# Patient Record
Sex: MALE
[De-identification: names, ages, dates, MRNs, and addresses within clinical notes are randomized per-mention and may not be internally consistent; named-entity substitution may affect disease eponyms.]

---

## 2019-10-22 PROBLEM — Z00.00 ENCOUNTER FOR PREVENTIVE HEALTH EXAMINATION: Status: ACTIVE | Noted: 2019-10-22

## 2019-10-23 ENCOUNTER — APPOINTMENT (OUTPATIENT)
Dept: OTOLARYNGOLOGY | Facility: CLINIC | Age: 79
End: 2019-10-23
Payer: MEDICARE

## 2019-10-23 VITALS
SYSTOLIC BLOOD PRESSURE: 127 MMHG | WEIGHT: 148 LBS | HEIGHT: 67 IN | BODY MASS INDEX: 23.23 KG/M2 | HEART RATE: 64 BPM | DIASTOLIC BLOOD PRESSURE: 64 MMHG

## 2019-10-23 DIAGNOSIS — Z82.3 FAMILY HISTORY OF STROKE: ICD-10-CM

## 2019-10-23 DIAGNOSIS — Z82.49 FAMILY HISTORY OF ISCHEMIC HEART DISEASE AND OTHER DISEASES OF THE CIRCULATORY SYSTEM: ICD-10-CM

## 2019-10-23 DIAGNOSIS — Z87.39 PERSONAL HISTORY OF OTHER DISEASES OF THE MUSCULOSKELETAL SYSTEM AND CONNECTIVE TISSUE: ICD-10-CM

## 2019-10-23 DIAGNOSIS — Z86.39 PERSONAL HISTORY OF OTHER ENDOCRINE, NUTRITIONAL AND METABOLIC DISEASE: ICD-10-CM

## 2019-10-23 DIAGNOSIS — Z86.79 PERSONAL HISTORY OF OTHER DISEASES OF THE CIRCULATORY SYSTEM: ICD-10-CM

## 2019-10-23 DIAGNOSIS — Z82.5 FAMILY HISTORY OF ASTHMA AND OTHER CHRONIC LOWER RESPIRATORY DISEASES: ICD-10-CM

## 2019-10-23 DIAGNOSIS — Z87.09 PERSONAL HISTORY OF OTHER DISEASES OF THE RESPIRATORY SYSTEM: ICD-10-CM

## 2019-10-23 DIAGNOSIS — Z83.3 FAMILY HISTORY OF DIABETES MELLITUS: ICD-10-CM

## 2019-10-23 PROCEDURE — 31231 NASAL ENDOSCOPY DX: CPT

## 2019-10-23 PROCEDURE — 99213 OFFICE O/P EST LOW 20 MIN: CPT | Mod: 25

## 2019-10-23 PROCEDURE — 69210 REMOVE IMPACTED EAR WAX UNI: CPT

## 2019-10-23 PROCEDURE — 99203 OFFICE O/P NEW LOW 30 MIN: CPT | Mod: 25

## 2019-10-23 NOTE — CONSULT LETTER
[FreeTextEntry1] : \par \par Dear  Dr. KEERTHI HERNANDEZ,\par \par I had the pleasure of seeing your patient today.  \par Please see my note below.\par \par \par Thank you very much for allowing me to participate in the care of your patient.\par \par Sincerely,\par \par \par Brian Shay MD\par [facility]\par [facility]\par  [facility]\par \par  [FreeTextEntry2] : KEERTHI HERNANDEZ\par

## 2019-10-23 NOTE — PHYSICAL EXAM
[FreeTextEntry1] : \par The patient was alert and oriented and in no distress.\par Voice was clear.\par \par Face:\par The patient had no facial asymmetry or mass.\par The skin was unremarkable.\par \par Eyes:\par The pupils were equal round and reactive to light and accommodation.\par There was no significant nystagmus or disconjugate gaze noted.\par \par Nose: \par The external nose had no significant deformity.  There was no facial tenderness.  On anterior rhinoscopy, the nasal mucosa was clear.  \par \par \par Oral cavity:\par The oral mucosa was normal.\par The oral and base of tongue were clear and without mass.\par The gingival and buccal mucosa were moist and without lesions.\par The palate moved well.\par There was no cleft to the palate.\par There appeared to be good salivary flow.  \par There was no pus, erythema or mass in the oral cavity.\par Ears:\par  Procedure note:\par  Removal of Cerumen Impactions, both ears:  48840-17\par Both external ears were normal.\par There were symptomatic cerumen impactions in both ears.  These were cleared microscopically without trauma using both suction and curettes.  After clearing,  both ear canals were clear both eardrums were intact and mobile.  \par Neck: \par The neck was symmetrical.\par The parotid and submandibular glands were normal without masses.\par The trachea was midline and there was no unusual crepitus.\par The thyroid was smooth and nontender and no masses were palpated.\par There was no significant cervical adenopathy.\par \par \par Neuro:\par Neurologically, the patient was awake, alert, and oriented to person, place and time. There were no obvious focal neurologic abnormalities.  Cranial nerves II through XII were grossly intact.\par \par \par TMJ:\par The temporomandibular joints were nontender.\par There was no abnormal crepitus and no significant malocclusion\par \par Nasal endoscopy: \par \par Procedure Note:\par \par Nasal endoscopy was done with topical anesthesia and a fiberoptic endoscope.\par Indication: Nasal congestion, rule out sinusitis.\par Procedure: The nasal cavity was anesthetized with topical Afrin and Pontocaine. An  endoscope was used and inserted into the nasal cavity. \par Endocoscopy was performed to inspect the interior of the nasal cavity, the nasal septum,  the middle and superior meati, the inferior, middle and superior turbinates, and the spheno-ethmoidal  recesses, the nasopharynx and eustachian tube orifices bilaterally\par  This showed that the nasal mucosa was slightly boggy.  There was a moderate S-shaped septal deflection.  However, the middle meati were open.  There were no polyps, purulence or masses.  The eustachian tube orifices were clear.  The nasopharynx was benign and without mass.  The inferior and middle turbinates were slightly boggy, the superior meati were normal and the sphenoethmoidal recesses were without evidence of disease.\par \par Flexible fiberoptic laryngoscopy: [facility] [id number]\par Indications: Dysphagia\par Procedure note:\par \par Flexible fiberoptic laryngoscopy was performed because of dysphagia and because of the patient's inability to tolerate adequate mirror examination.\par \par The nasal cavity was anesthetized with Pontocaine and Afrin.\par The flexible endoscope was placed into the patient's nasal cavity.\par The nasopharynx was without masses.\par The oropharynx, vallecula and base of tongue had no masses.\par The epiglottis, aryepiglottic folds and false vocal cords were normal.\par The pyriform sinuses were without mucosal lesions or pooling of secretions.  \par The laryngeal ventricles were without lesions.\par The visualized subglottis was without mass.\par The lateral and posterior pharyngeal walls were clear and symmetrical.\par The vocal folds were clear and mobile; they abducted and adducted normally.\par There was no interarytenoid mass or fullness.\par \par

## 2019-10-23 NOTE — REVIEW OF SYSTEMS
[Sneezing] : sneezing [Hearing Loss] : hearing loss [Throat Clearing] : throat clearing [Hoarseness] : hoarseness [Cough] : cough [Joint Pain] : joint pain [Itching] : itching [Erectile Dysfunction] : erectile dysfunction [Negative] : Heme/Lymph

## 2019-10-23 NOTE — HISTORY OF PRESENT ILLNESS
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 Additionally, he has a history of cerumen impaction nose and hearing loss. 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 He tells me that his chest evaluation has been negative. I had last seen him four years ago. The patient had no other ear nose or throat complaints at this visit.

## 2019-10-23 NOTE — ASSESSMENT
[FreeTextEntry1] : It was my impression that the patient had a cerumen impaction that was cleared.  I recommended topical moisturizing.  I recommended avoiding Q-tips.  I reviewed aural hygiene and the role of cerumen with the patient.  I suggested a repeat visit in a year or earlier should the need arise.\par He has hearing losses on his testing for years ago but at that time to repeat this today. He will do so at his next visit. He has rhinitis but I did not see evidence of sinusitis as the cause of his cough.\par His pulmonary evaluation, he tells me has been clear and this may well be from reflux. He has had that in the past. He is now taking famotidine 20 mg twice a day, however with food and I suggested switching to do an empty stomach and an antireflux diet with him. If he is not improving I would consider pH testing and possible imaging of the paranasal sinuses.  I suggested nasal rinsing for his trip to [place].\par The cough could be from his losartan as well-\par

## 2019-10-31 ENCOUNTER — RX RENEWAL (OUTPATIENT)
Age: 79
End: 2019-10-31

## 2020-11-02 ENCOUNTER — APPOINTMENT (OUTPATIENT)
Dept: OTOLARYNGOLOGY | Facility: CLINIC | Age: 80
End: 2020-11-02

## 2020-11-09 ENCOUNTER — APPOINTMENT (OUTPATIENT)
Dept: OTOLARYNGOLOGY | Facility: CLINIC | Age: 80
End: 2020-11-09
Payer: MEDICARE

## 2020-11-09 VITALS — WEIGHT: 148 LBS | TEMPERATURE: 97.5 F | BODY MASS INDEX: 23.23 KG/M2 | HEIGHT: 67 IN

## 2020-11-09 PROCEDURE — 31231 NASAL ENDOSCOPY DX: CPT

## 2020-11-09 PROCEDURE — 92557 COMPREHENSIVE HEARING TEST: CPT

## 2020-11-09 PROCEDURE — 92567 TYMPANOMETRY: CPT

## 2020-11-09 PROCEDURE — 99214 OFFICE O/P EST MOD 30 MIN: CPT | Mod: 25

## 2020-11-09 PROCEDURE — G0268 REMOVAL OF IMPACTED WAX MD: CPT

## 2020-11-09 NOTE — ASSESSMENT
[FreeTextEntry1] : It was my impression that the patient had a cerumen impaction that was cleared.  I recommended topical moisturizing.  I recommended avoiding Q-tips.  I reviewed aural hygiene and the role of cerumen with the patient.  I suggested a repeat visit in a year or earlier should the need arise.\par His hearing losses are borderline for hearing aids and this was discussed. I would repeat his hearing evaluation next year.  He felt that his hearing was improved after clearing the cerumen impactions. \par He has a rhinitis which is improving with fluticasone and I suggested continuing and possibly switching or adding a course of Atrovent for a presumed vasomotor component if he is not doing well enough. \par The cough is most consistent with L. p.r. and . this was discussed. Head been recommended to  increase in his antacid therapy and I felt it is worth trying.  He will go ahead with pepcid  40 mg po bid. l He had been taking this with food- and the rationale of taking on an empty stomach was reviewed as well as an anti reflux diet and elevation of the head of the bed. I also suggested esophagram for further elucidation rather than necessarily pH monitoring if not improving in the next 4-6 weeks.\par \par

## 2020-11-09 NOTE — PHYSICAL EXAM
[FreeTextEntry1] : The patient was alert and oriented and in no distress.\par Voice was clear.\par \par Face:\par The patient had no facial asymmetry or mass.\par The skin was unremarkable.\par \par Eyes:\par The pupils were equal round and reactive to light and accommodation.\par There was no significant nystagmus or disconjugate gaze noted.\par \par Nose: \par The external nose had no significant deformity.  There was no facial tenderness.  On anterior rhinoscopy, the nasal mucosa was clear.  The anterior septum was midline.  There were no visualized polyps purulence  or masses.\par \par Oral cavity:\par The oral mucosa was normal.\par The oral and base of tongue were clear and without mass.\par The gingival and buccal mucosa were moist and without lesions.\par The palate moved well.\par There was no cleft to the palate.\par There appeared to be good salivary flow.  \par There was no pus, erythema or mass in the oral cavity.\par \par \par Neck: \par The neck was symmetrical.\par The parotid and submandibular glands were normal without masses.\par The trachea was midline and there was no unusual crepitus.\par The thyroid was smooth and nontender and no masses were palpated.\par There was no significant cervical adenopathy.\par \par \par Neuro:\par Neurologically, the patient was awake, alert, and oriented to person, place and time. There were no obvious focal neurologic abnormalities.  Cranial nerves II through XII were grossly intact.\par \par \par TMJ:\par The temporomandibular joints were nontender.\par There was no abnormal crepitus and no significant malocclusion\par  [de-identified] : Nasal endoscopy: \par CPT 61265\par Procedure Note:\par \par Endoscopy was done with Covid precautions and with video. All risks and benefits were discussed with the patient and consent obtained.\par \par Nasal endoscopy was done with topical anesthesia of Pontocaine and Afrin and a      nasal endoscope.\par Indication: Nasal congestion, rule out sinusitis.\par Procedure: The nasal cavity was anesthetized with topical Afrin and Pontocaine. An  endoscope was used and inserted into the nasal cavity.\par Attention was first paid to the anterior nasal cavity.\par Endocoscopy was performed to inspect the interior of the nasal cavity, the nasal septum,  the middle and superior meati, the inferior, middle and superior turbinates, and the spheno-ethmoidal  recesses, the nasopharynx and eustachian tube orifices bilaterally. \par All findings were normal except:\par Nasal mucosa was flat and watery but without polyps, purulence or masses\par \par \par Flexible fiberoptic laryngoscopy: CPT 19055\par Indications: Dysphagia\par Procedure note:\par  \par Flexible fiberoptic laryngoscopy was performed because of dysphagia and the patient's inability to tolerate adequate mirror examination.\par The nasal cavity was anesthetized with Pontocaine plus Afrin.\par \par \par The nasal cavity was anesthetized with Pontocaine and Afrin.\par The flexible endoscope was placed into the patient's nasal cavity.\par The nasopharynx was without masses.\par The oropharynx, vallecula and base of tongue had no masses.\par The epiglottis, aryepiglottic folds and false vocal cords were normal.\par The pyriform sinuses were without mucosal lesions or pooling of secretions.  \par The laryngeal ventricles were without lesions.\par The visualized subglottis was without mass.\par The lateral and posterior pharyngeal walls were clear and symmetrical.\par The vocal folds were clear and mobile; they abducted and adducted normally.\par There was no interarytenoid mass or fullness.\par There was significant posterior laryngeal inflammation consistent with reflux.\par \par Endoscopy was done with Covid precautions and with video. All risks and benefits were discussed with the patient and consent obtained. \par \par There was some pooling of secretions postcricoid and in the piriforms   Audiogram:\par \par Audiometry showed that both ears had normal hearing through the  LOWER frequencies with high pitched symmetric sensorineural hearing loss as above that.  Reflexes were intact and the patient had type A tympanograms.  The audiogram was reviewed with the patient. \par Ears:\par Procedure note: Debridement of cerumen impaction left ear   42270\par \par Dx:  symptomatic cerumen impaction left ear \par Both external ears were normal.\par There was a dense cerumen impaction in the left ear.  This was cleared microscopically without trauma, using suction and curettes.  Beyond that, both ear canals were clear and both eardrums were intact and mobile.\par \par Nasal endoscopy: \par CPT 99294\par Procedure Note:\par \par Endoscopy was done with Covid precautions and with video. All risks and benefits were discussed with the patient and consent obtained.\par \par Nasal endoscopy was done with topical anesthesia of Pontocaine and Afrin and a      nasal endoscope.\par Indication: Nasal congestion, rule out sinusitis.\par Procedure: The nasal cavity was anesthetized with topical Afrin and Pontocaine. An  endoscope was used and inserted into the nasal cavity.\par Attention was first paid to the anterior nasal cavity.\par Endocoscopy was performed to inspect the interior of the nasal cavity, the nasal septum,  the middle and superior meati, the inferior, middle and superior turbinates, and the spheno-ethmoidal  recesses, the nasopharynx and eustachian tube orifices bilaterally. \par All findings were normal except:\par Mucosa is somewhat boggy but without polyps purulence or masses.\par \par Flexible fiberoptic laryngoscopy: CPT 04431\par Indications: Dysphagia\par Procedure note:\par  \par Flexible fiberoptic laryngoscopy was performed because of dysphagia and the patient's inability to tolerate adequate mirror examination.\par The nasal cavity was anesthetized with Pontocaine plus Afrin.\par \par \par The nasal cavity was anesthetized with Pontocaine and Afrin.\par The flexible endoscope was placed into the patient's nasal cavity.\par The nasopharynx was without masses.\par The oropharynx, vallecula and base of tongue had no masses.\par The epiglottis, aryepiglottic folds and false vocal cords were normal.\par The pyriform sinuses were without mucosal lesions or pooling of secretions.  \par The laryngeal ventricles were without lesions.\par The visualized subglottis was without mass.\par The lateral and posterior pharyngeal walls were clear and symmetrical.\par The vocal folds were clear and mobile; they abducted and adducted normally.\par There was no interarytenoid mass or fullness.\par There was significant posterior laryngeal inflammation consistent with reflux.\par \par Endoscopy was done with Covid precautions and with video. All risks and benefits were discussed with the patient and consent obtained.\par There was thick clear mucus in the piriform sinuses and postcricoid.

## 2020-11-09 NOTE — HISTORY OF PRESENT ILLNESS
[de-identified] : IGGY LUTHER When seen in followup on November 9. I had last seen him a year ago. He comes in complaining, or at least his wife is complaining, and that his hearing has gotten worse. He has some nasal congestion and a watery nasal discharge and he had started back on Flonase and this has improved. However, he still has a dry cough and throat irritation. The cough is worse after eating. He has been using Pepcid 20 mg twice a day although his internist to just suggested switching to b.i.d. proton pump inhibitors and Pepcid at bedtime.\par The patient had no other ear nose or throat complaints at this visit.

## 2020-11-09 NOTE — CONSULT LETTER
[FreeTextEntry2] : KEERTHI HERNANDEZ\par  [FreeTextEntry1] : \par \par Dear  Dr. KEERTHI HERNANDEZ,\par \par I had the pleasure of seeing your patient today.  \par Please see my note below.\par \par \par Thank you very much for allowing me to participate in the care of your patient.\par \par Sincerely,\par \par \par Brian Shay MD\par [facility]\par [facility]\par  [facility]\par \par

## 2020-12-21 ENCOUNTER — APPOINTMENT (OUTPATIENT)
Dept: OTOLARYNGOLOGY | Facility: CLINIC | Age: 80
End: 2020-12-21

## 2021-03-29 ENCOUNTER — APPOINTMENT (OUTPATIENT)
Dept: OTOLARYNGOLOGY | Facility: CLINIC | Age: 81
End: 2021-03-29

## 2021-07-23 ENCOUNTER — APPOINTMENT (OUTPATIENT)
Dept: OTOLARYNGOLOGY | Facility: CLINIC | Age: 81
End: 2021-07-23

## 2021-09-13 ENCOUNTER — RX RENEWAL (OUTPATIENT)
Age: 81
End: 2021-09-13

## 2021-09-27 ENCOUNTER — APPOINTMENT (OUTPATIENT)
Dept: OTOLARYNGOLOGY | Facility: CLINIC | Age: 81
End: 2021-09-27
Payer: MEDICARE

## 2021-09-27 VITALS — WEIGHT: 148 LBS | BODY MASS INDEX: 23.23 KG/M2 | HEIGHT: 67 IN | TEMPERATURE: 96 F

## 2021-09-27 PROCEDURE — 31231 NASAL ENDOSCOPY DX: CPT

## 2021-09-27 PROCEDURE — 99214 OFFICE O/P EST MOD 30 MIN: CPT | Mod: 25

## 2021-09-27 PROCEDURE — 92567 TYMPANOMETRY: CPT

## 2021-09-27 PROCEDURE — G0268 REMOVAL OF IMPACTED WAX MD: CPT

## 2021-09-27 PROCEDURE — 92557 COMPREHENSIVE HEARING TEST: CPT

## 2021-09-27 NOTE — HISTORY OF PRESENT ILLNESS
[de-identified] : IGGY LUTHER is a 81 year old male who comes in complaining of an intermittent and relatively nonproductive cough. At times he will have a slightly green colored phlegm. This is worse after eating.  He believes he is taking antacids as previously recommended (famotidine, 40 mg po bid). He has to clear his throat quite a bit. Last year I suggested a cervical esophagram. He has the history of hearing losses and is not sure whether it has gotten worse. He previously had a watery nasal discharge that is not so bothersome as he is using Flonase.The patient had no other ear nose or throat complaints at this visit. 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

## 2021-09-27 NOTE — REASON FOR VISIT
[Subsequent Evaluation] : a subsequent evaluation for [FreeTextEntry2] : cough, constant throat clearing

## 2021-09-27 NOTE — ASSESSMENT
[FreeTextEntry1] : It was my impression that the patient had a cerumen impaction that was cleared.  I recommended topical moisturizing.  I recommended avoiding Q-tips.  I reviewed aural hygiene and the role of cerumen with the patient.  I suggested a repeat visit in a year or earlier should the need arise.\par \par The hearing test was reviewed with the patient.  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  I felt that the patient would benefit from hearing aids and this was  recommended\par \par He still has the intermittent coughing, worse after eating and it seems most consistent with reflux. At this point, I again suggested cervical esophagram. It would be to make sure that he is not aspirating but did not recommend increasing his reflux medication\par \par He has a rhinitis and a septal deflection and I recommended adding Flonase to his medical regimen.\par \par Otherwise, I reassured him and \par would like to see him back in followup after the esophagram or in 6 months or as needed\par \par Some of his history and instructions were done through his wife over the telephone

## 2021-09-27 NOTE — PHYSICAL EXAM
[FreeTextEntry1] : General:\par The patient was alert and oriented and in no distress.\par Voice was clear.\par He had an intermittent brassy cough\par \par Ears:\par  Procedure note:\par  Removal of Cerumen Impactions, both ears:  30596-37\par Both external ears were normal.\par There were symptomatic cerumen impactions in both ears.  These were cleared microscopically without trauma using both suction and curettes.  After clearing,  both ear canals were clear both eardrums were intact and mobile.  \par \par Oral cavity:\par The oral mucosa was normal.\par The oral and base of tongue were clear and without mass.\par The gingival and buccal mucosa were moist and without lesions.\par The palate moved well.\par There was no cleft to the palate.\par There appeared to be good salivary flow.  \par There was no pus, erythema or mass in the oral cavity.\par \par Neck: \par The neck was symmetrical.\par The parotid and submandibular glands were normal without masses.\par The trachea was midline and there was no unusual crepitus.\par The thyroid was smooth and nontender and no masses were palpated.\par There was no significant cervical adenopathy.\par \par Face:\par The patient had no facial asymmetry or mass.\par The skin was unremarkable.\par \par Neuro:\par Neurologically, the patient was awake, alert, and oriented to person, place and time. There were no obvious focal neurologic abnormalities.  Cranial nerves II through XII were grossly intact.\par \par TMJ:\par The temporomandibular joints were nontender.\par There was no abnormal crepitus and no significant malocclusion\par  [de-identified] : Nasal endoscopy: \par CPT 75851\par Procedure Note:\par \par Endoscopy was done with Covid precautions and with video. All risks and benefits were discussed with the patient and consent obtained.\par \par Nasal endoscopy was done with topical anesthesia of Pontocaine and Afrin and a      nasal endoscope.\par Indication: Nasal congestion, rule out sinusitis.\par Procedure: The nasal cavity was anesthetized with topical Afrin and Pontocaine. An  endoscope was used and inserted into the nasal cavity.\par Attention was first paid to the anterior nasal cavity.\par Endocoscopy was performed to inspect the interior of the nasal cavity, the nasal septum,  the middle and superior meati, the inferior, middle and superior turbinates, and the spheno-ethmoidal  recesses, the nasopharynx and eustachian tube orifices bilaterally. \par All findings were normal except:\par \par Mucosa is well with a sharp left septal deflection coming back to the right but without polyps, purulence or masses.\par \par Flexible fiberoptic laryngoscopy: CPT 74232\par Indications: Dysphagia\par Procedure note:\par \par Flexible fiberoptic laryngoscopy was performed because of dysphagia and because of the patient's inability to tolerate adequate mirror examination.\par \par The nasal cavity was anesthetized with Pontocaine and Afrin.\par The flexible endoscope was placed into the patient's nasal cavity.\par The nasopharynx was without masses.\par The oropharynx, vallecula and base of tongue had no masses.\par The epiglottis, aryepiglottic folds and false vocal cords were normal.\par The pyriform sinuses were without mucosal lesions or pooling of secretions.  \par The laryngeal ventricles were without lesions.\par The visualized subglottis was without mass.\par The lateral and posterior pharyngeal walls were clear and symmetrical.\par The vocal folds were clear and mobile; they abducted and adducted normally.\par There was no interarytenoid mass or fullness.\par \par Endoscopy was done with Covid precautions and with video. All risks and benefits were discussed with the patient and consent obtained.\par There was moderate posterior pharyngeal inflammation and some loss of true vocal fold bulk\par \par \par Audiogram:\par \par Audiometry showed that both ears had normal hearing through the low speech frequencies with high pitched symmetric sensorineural hearing loss as above that.  Reflexes were intact and the patient had type A tympanograms.  The audiogram was reviewed with the patient.

## 2021-09-27 NOTE — CONSULT LETTER
[FreeTextEntry2] : KEERTHI HERNANDEZ\par  [FreeTextEntry1] : \par \par Dear  Dr. KEERTHI HERNANDEZ,\par \par I had the pleasure of seeing your patient today.  \par Please see my note below.\par \par \par Thank you very much for allowing me to participate in the care of your patient.\par \par Sincerely,\par \par \par Brian Shay MD\par [facility]\par [facility]\par  [facility]\par \par

## 2022-03-28 ENCOUNTER — APPOINTMENT (OUTPATIENT)
Dept: OTOLARYNGOLOGY | Facility: CLINIC | Age: 82
End: 2022-03-28

## 2022-05-16 ENCOUNTER — APPOINTMENT (OUTPATIENT)
Dept: OTOLARYNGOLOGY | Facility: CLINIC | Age: 82
End: 2022-05-16
Payer: MEDICARE

## 2022-05-16 DIAGNOSIS — R42 DIZZINESS AND GIDDINESS: ICD-10-CM

## 2022-05-16 PROCEDURE — 92557 COMPREHENSIVE HEARING TEST: CPT

## 2022-05-16 PROCEDURE — 99214 OFFICE O/P EST MOD 30 MIN: CPT | Mod: 25

## 2022-05-16 PROCEDURE — 92567 TYMPANOMETRY: CPT

## 2022-05-16 PROCEDURE — G0268 REMOVAL OF IMPACTED WAX MD: CPT

## 2022-05-16 PROCEDURE — 31231 NASAL ENDOSCOPY DX: CPT

## 2022-05-16 RX ORDER — FAMOTIDINE 20 MG/1
20 TABLET, FILM COATED ORAL
Qty: 180 | Refills: 3 | Status: DISCONTINUED | COMMUNITY
Start: 2019-10-31 | End: 2022-05-16

## 2022-05-16 NOTE — REVIEW OF SYSTEMS
[Ear Pain] : ear pain [Vertigo] : vertigo [Throat Clearing] : throat clearing [Negative] : Heme/Lymph

## 2022-08-04 NOTE — CONSULT LETTER
[FreeTextEntry1] : \par \par Dear  Dr. KEERTHI HERNANDEZ,\par \par I had the pleasure of seeing your patient today.  \par Please see my note below.\par \par \par Thank you very much for allowing me to participate in the care of your patient.\par \par Sincerely,\par \par \par Brian Shay MD\par [facility]\par [facility]\par  [facility]\par \par  [FreeTextEntry2] : KEERTHI HERNANDEZ\par

## 2022-08-04 NOTE — ASSESSMENT
[FreeTextEntry1] : 1.  It was my impression that he has the dizziness when changing to the upright position.  Maria Guadalupe-Hallpike was negative and this does not seem primarily 8th nerve in etiology but more consistent with vascular etiology.  He did not have any change in blood pressure when changing position, however.  And I discussed this further with him.  I suggested vestibular therapy in any case at this point especially in terms of his gait.\par 2.  He has the coughing after eating and I again recommended following up with the cervical esophagram I had recommended last year.  I explained that the concern is that of aspiration.  I recommend continuing on the twice a day famotidine for reflux\par \par 3.  He has the vasomotor rhinitis that is not that bothersome for him and I did not suggest other intervention\par \par 4.  He has the sensorineural hearing losses and that has been relatively stable but I further recommended hearing aids.  He declined at this point as he said there is not much good to hear\par \par \par 5.  It was my impression that the patient had a cerumen impaction that was cleared.  I recommended topical moisturizing.  I recommended avoiding Q-tips.  I reviewed aural hygiene and the role of cerumen with the patient.  I suggested a repeat visit in a year or earlier should the need arise.\par \par 6.  I would like to see him back in follow-up after the above\par

## 2022-08-04 NOTE — PHYSICAL EXAM
[de-identified] : General:\par The patient was alert and oriented and in no distress.\par Voice was slightly breathy  \par \par Ears:\par  Procedure note:\par  Removal of Cerumen Impactions, both ears:  28868-53\par Both external ears were normal.\par There were symptomatic cerumen impactions in both ears.  These were cleared microscopically without trauma using both suction and curettes.  After clearing,  both ear canals were clear both eardrums were intact and mobile.  \par \par Oral cavity:\par The oral mucosa was normal.\par The oral and base of tongue were clear and without mass.\par The gingival and buccal mucosa were moist and without lesions.\par The palate moved well.\par There was no cleft to the palate.\par There appeared to be good salivary flow.  \par There was no pus, erythema or mass in the oral cavity.\par \par Neck: \par The neck was symmetrical.\par The parotid and submandibular glands were normal without masses.\par The trachea was midline and there was no unusual crepitus.\par The thyroid was smooth and nontender and no masses were palpated.\par There was no significant cervical adenopathy.\par His neck was quite tight with limited lateral motion\par \par \par Neuro:\par Neurologically, the patient was awake, alert, and oriented to person, place and time. There were no obvious focal neurologic abnormalities.  Cranial nerves II through XII were grossly intact.\par \par \par Otoneuro:\par No significant nystagmus was noted.\par Finger nose finger and rapid alternating motions were normal.\par Romberg testing was normal.\par Austin-Hallpike maneuver was negative.\par There was no bruit or thrill in the neck.\par \par \par Nose: \par The external nose had no significant deformity.  There was no facial tenderness.  On anterior rhinoscopy, the nasal mucosa was clear.  The anterior septum was midline.  There were no visualized polyps purulence  or masses.\par \par Ears:\par  Procedure note:\par  Removal of Cerumen Impactions, both ears:  66718-26\par Both external ears were normal.\par There were symptomatic cerumen impactions in both ears.  These were cleared microscopically without trauma using both suction and curettes.  After clearing,  both ear canals were clear both eardrums were intact and mobile.. [de-identified] : /63   sitting and    129/62 standing     audiogram:\par \par Audiometry showed that both ears had normal hearing through the speech frequencies with high pitched symmetric sensorineural hearing loss as above that.  Reflexes were intact and the patient had type A tympanograms.  The audiogram was reviewed with the patient.  There was not much change      nasal endoscopy: \par CPT 64829\par Procedure Note:\par \par Endoscopy was done with Covid precautions and with video. All risks and benefits were discussed with the patient and consent obtained.\par \par Nasal endoscopy was done with topical anesthesia of Pontocaine and Afrin and a      nasal endoscope.\par Indication: Nasal congestion, rule out sinusitis.\par Procedure: The nasal cavity was anesthetized with topical Afrin and Pontocaine. An  endoscope was used and inserted into the nasal cavity.\par Attention was first paid to the anterior nasal cavity.\par Endocoscopy was performed to inspect the interior of the nasal cavity, the nasal septum,  the middle and superior meati, the inferior, middle and superior turbinates, and the spheno-ethmoidal  recesses, the nasopharynx and eustachian tube orifices bilaterally. \par All findings were normal except:\par The mucosa was flattened and watery with a moderate S-shaped deflection.  Flexible fiberoptic laryngoscopy: CPT 41732\par Indications: Dysphagia\par Procedure note:\par \par Flexible fiberoptic laryngoscopy was performed because of dysphagia and because of the patient's inability to tolerate adequate mirror examination.\par \par The nasal cavity was anesthetized with Pontocaine and Afrin.\par The flexible endoscope was placed into the patient's nasal cavity.\par The nasopharynx was without masses.\par The oropharynx, vallecula and base of tongue had no masses.\par The epiglottis, aryepiglottic folds and false vocal cords were normal.\par The pyriform sinuses were without mucosal lesions or pooling of secretions.  \par The laryngeal ventricles were without lesions.\par The visualized subglottis was without mass.\par The lateral and posterior pharyngeal walls were clear and symmetrical.\par The vocal folds were clear and mobile; they abducted and adducted normally.\par There was no interarytenoid mass or fullness.\par \par Endoscopy was done with Covid precautions and with video. All risks and benefits were discussed with the patient and consent obtained.  He has posterior laryngeal inflammation.

## 2022-08-04 NOTE — ADDENDUM
[FreeTextEntry1] : 8/4/22\par \par Cervical esophagram--  large sliding HH but o reflux elicited...\par Decreased posterior pharyngeal-prevertebral muscle contraction, decreased esophageal motility.\par \par RLP

## 2022-08-04 NOTE — HISTORY OF PRESENT ILLNESS
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  He comes in now noticing he had right-sided neck and ear pain.  This has resolved.  However he still has other complaints including a spinning sensation when he changes to the upright position primarily but also when laying down.  He also has the history of coughing after swallowing at times and throat clearing.  He also has a watery nasal discharge.  I had previously recommended a cervical esophagram which was not done yet.  He is now taking famotidine twice a day.  The patient had no other ear nose or throat complaints at this visit.

## 2022-10-19 ENCOUNTER — RX RENEWAL (OUTPATIENT)
Age: 82
End: 2022-10-19

## 2022-10-19 RX ORDER — FAMOTIDINE 40 MG/1
40 TABLET, FILM COATED ORAL
Qty: 180 | Refills: 3 | Status: ACTIVE | COMMUNITY
Start: 2020-11-09 | End: 1900-01-01

## 2023-08-23 ENCOUNTER — APPOINTMENT (OUTPATIENT)
Dept: OTOLARYNGOLOGY | Facility: CLINIC | Age: 83
End: 2023-08-23
Payer: MEDICARE

## 2023-08-23 DIAGNOSIS — J31.0 CHRONIC RHINITIS: ICD-10-CM

## 2023-08-23 DIAGNOSIS — H61.23 IMPACTED CERUMEN, BILATERAL: ICD-10-CM

## 2023-08-23 DIAGNOSIS — H93.299 OTHER ABNORMAL AUDITORY PERCEPTIONS, UNSPECIFIED EAR: ICD-10-CM

## 2023-08-23 DIAGNOSIS — R05.9 COUGH, UNSPECIFIED: ICD-10-CM

## 2023-08-23 DIAGNOSIS — K21.9 GASTRO-ESOPHAGEAL REFLUX DISEASE W/OUT ESOPHAGITIS: ICD-10-CM

## 2023-08-23 DIAGNOSIS — H90.3 SENSORINEURAL HEARING LOSS, BILATERAL: ICD-10-CM

## 2023-08-23 DIAGNOSIS — J34.2 DEVIATED NASAL SEPTUM: ICD-10-CM

## 2023-08-23 PROCEDURE — G0268 REMOVAL OF IMPACTED WAX MD: CPT

## 2023-08-23 PROCEDURE — 92567 TYMPANOMETRY: CPT

## 2023-08-23 PROCEDURE — 31231 NASAL ENDOSCOPY DX: CPT

## 2023-08-23 PROCEDURE — 99214 OFFICE O/P EST MOD 30 MIN: CPT | Mod: 25

## 2023-08-23 PROCEDURE — 92557 COMPREHENSIVE HEARING TEST: CPT

## 2023-08-23 RX ORDER — MULTIVIT-MIN/IRON/FOLIC ACID/K 18-600-40
CAPSULE ORAL
Refills: 0 | Status: ACTIVE | COMMUNITY

## 2023-08-23 RX ORDER — CHROMIUM 200 MCG
TABLET ORAL
Refills: 0 | Status: ACTIVE | COMMUNITY

## 2023-08-23 RX ORDER — IRBESARTAN 150 MG/1
150 TABLET ORAL
Refills: 0 | Status: ACTIVE | COMMUNITY

## 2023-08-23 RX ORDER — METOPROLOL SUCCINATE 50 MG/1
50 TABLET, EXTENDED RELEASE ORAL
Refills: 0 | Status: ACTIVE | COMMUNITY

## 2023-08-23 RX ORDER — ATORVASTATIN CALCIUM 10 MG/1
10 TABLET, FILM COATED ORAL
Refills: 0 | Status: ACTIVE | COMMUNITY

## 2023-08-23 RX ORDER — SITAGLIPTIN 100 MG/1
100 TABLET, FILM COATED ORAL
Refills: 0 | Status: ACTIVE | COMMUNITY

## 2023-08-23 RX ORDER — GLIMEPIRIDE 4 MG/1
4 TABLET ORAL
Refills: 0 | Status: ACTIVE | COMMUNITY

## 2023-08-23 RX ORDER — METFORMIN HYDROCHLORIDE 500 MG/1
500 TABLET, COATED ORAL
Refills: 0 | Status: ACTIVE | COMMUNITY

## 2023-08-23 RX ORDER — MULTIVITAMIN
TABLET ORAL
Refills: 0 | Status: ACTIVE | COMMUNITY

## 2023-08-23 RX ORDER — ALFUZOSIN HYDROCHLORIDE 10 MG/1
10 TABLET, EXTENDED RELEASE ORAL
Refills: 0 | Status: ACTIVE | COMMUNITY

## 2023-08-23 NOTE — PHYSICAL EXAM
[FreeTextEntry1] : General: The patient was alert and oriented and in no distress. Voice was clear.  He had a slightly mucousy quality to his voice.  Ears:  Procedure note:  Removal of Cerumen Impactions, both ears:  69027-85 Both external ears were normal. There were symptomatic cerumen impactions in both ears.  These were cleared microscopically without trauma using both suction and curettes.  After clearing,  both ear canals were clear both eardrums were intact and mobile.    Oral cavity: The oral mucosa was normal. The oral and base of tongue were clear and without mass. The gingival and buccal mucosa were moist and without lesions. The palate moved well. There was no cleft to the palate. There appeared to be good salivary flow.   There was no pus, erythema or mass in the oral cavity.  Neck:  The neck was symmetrical. The parotid and submandibular glands were normal without masses. The trachea was midline and there was no unusual crepitus. The thyroid was smooth and nontender and no masses were palpated. There was no significant cervical adenopathy.  Nasal endoscopy:  CPT 69689 Procedure Note:  Endoscopy was done with Covid precautions and with video. All risks and benefits were discussed with the patient and consent obtained.  Nasal endoscopy was done with topical anesthesia of Pontocaine and Afrin and a      nasal endoscope. Indication: Nasal congestion, rule out sinusitis. Procedure: The nasal cavity was anesthetized with topical Afrin and Pontocaine. An  endoscope was used and inserted into the nasal cavity. Attention was first paid to the anterior nasal cavity. Endocoscopy was performed to inspect the interior of the nasal cavity, the nasal septum,  the middle and superior meati, the inferior, middle and superior turbinates, and the spheno-ethmoidal  recesses, the nasopharynx and eustachian tube orifices bilaterally. including the nasal mocosa, the possibility of polyps and the consistency of the nasal mucous. All findings were normal except:  The nasal mucosa is boggy but without polyps purulence or masses   Flexible fiberoptic laryngoscopy: CPT 96880 Indications: Dysphagia Procedure note:  Flexible fiberoptic laryngoscopy was performed because of dysphagia and because of the patient's inability to tolerate adequate mirror examination.  The nasal cavity was anesthetized with Pontocaine and Afrin. The flexible endoscope was placed into the patient's nasal cavity. The nasopharynx was without masses. The oropharynx, vallecula and base of tongue had no masses. The epiglottis, aryepiglottic folds and false vocal cords were normal. The pyriform sinuses were without mucosal lesions or pooling of secretions.   The laryngeal ventricles were without lesions. The visualized subglottis was without mass. The lateral and posterior pharyngeal walls were clear and symmetrical. The vocal folds were clear and mobile; they abducted and adducted normally. There was no interarytenoid mass or fullness.  Endoscopy was done with Covid precautions and with video. All risks and benefits were discussed with the patient and consent obtained. He has diminished sensation of the endolarynx   [de-identified] : A complete audiogram was ordered, done and reviewed with the patient.  This showed the bilateral sensorineural hearing losses and he had lost a few decibels since his previous exam

## 2023-08-23 NOTE — ASSESSMENT
[FreeTextEntry1] : It was my impression that he has the persistent slightly or minimally or nonproductive cough.  This seems most consistent with his reflux and hiatal hernia and he has a esophageal dysmotility.  This was explained.  I recommend continuing with famotidine.  Being careful not to eat so late and getting a wedge underneath the head of the bed at night.  I reviewed diet and suggested alkaline water as well.  I recommended Mucinex for the symptomatic relief but also suggested a speech swallow evaluation to hopefully protect against pneumonia in the future. The hearing test was reviewed with the patient.  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  I felt that the patient would benefit from hearing aids and this was  recommended. The temporomandibular joint was better and his dizziness had resolved

## 2023-08-23 NOTE — HISTORY OF PRESENT ILLNESS
[de-identified] : IGGY LUTHER was seen in follow-up on August 23.  I had last seen him in May.  The dizziness has improved and the right-sided neck and ear pain are better.  However, he still has the complaints of coughing and throat clearing.  He coughs primarily after swallowing but also when recumbent.  He has a watery nasal discharge.  He is taking 40 mg of famotidine twice a day.  He did go ahead with the esophagram which showed a large hiatal hernia but without significant reflux elicited and decreased posterior pharyngeal prevertebral muscle contraction with decreased esophageal motility.  He comes in for repeat evaluation

## 2023-08-23 NOTE — CONSULT LETTER
[FreeTextEntry2] : KEERTHI HERNANDEZ [FreeTextEntry1] :   Dear  Dr. KEERTHI HERNANDEZ,  I had the pleasure of seeing your patient today.   Please see my note below.   Thank you very much for allowing me to participate in the care of your patient.  Sincerely,  Brian Shay MD [facility]  [facility]

## 2023-08-24 PROBLEM — H93.299 ABNORMAL AUDITORY PERCEPTION: Status: ACTIVE | Noted: 2023-08-24

## 2024-02-28 ENCOUNTER — APPOINTMENT (OUTPATIENT)
Dept: OTOLARYNGOLOGY | Facility: CLINIC | Age: 84
End: 2024-02-28

## 2024-03-27 RX ORDER — FAMOTIDINE 40 MG/1
40 TABLET, FILM COATED ORAL TWICE DAILY
Qty: 180 | Refills: 3 | Status: ACTIVE | COMMUNITY
Start: 2023-08-23 | End: 1900-01-01

## 2024-11-20 ENCOUNTER — APPOINTMENT (OUTPATIENT)
Dept: OTOLARYNGOLOGY | Facility: CLINIC | Age: 84
End: 2024-11-20

## 2024-12-17 ENCOUNTER — APPOINTMENT (OUTPATIENT)
Dept: OTOLARYNGOLOGY | Facility: CLINIC | Age: 84
End: 2024-12-17
Payer: MEDICARE

## 2024-12-17 ENCOUNTER — NON-APPOINTMENT (OUTPATIENT)
Age: 84
End: 2024-12-17

## 2024-12-17 DIAGNOSIS — H61.23 IMPACTED CERUMEN, BILATERAL: ICD-10-CM

## 2024-12-17 DIAGNOSIS — R05.9 COUGH, UNSPECIFIED: ICD-10-CM

## 2024-12-17 DIAGNOSIS — K21.9 GASTRO-ESOPHAGEAL REFLUX DISEASE W/OUT ESOPHAGITIS: ICD-10-CM

## 2024-12-17 DIAGNOSIS — M26.609 UNSPECIFIED TEMPOROMANDIBULAR JOINT DISORDER: ICD-10-CM

## 2024-12-17 DIAGNOSIS — H90.3 SENSORINEURAL HEARING LOSS, BILATERAL: ICD-10-CM

## 2024-12-17 DIAGNOSIS — J34.2 DEVIATED NASAL SEPTUM: ICD-10-CM

## 2024-12-17 PROCEDURE — 99214 OFFICE O/P EST MOD 30 MIN: CPT | Mod: 25

## 2024-12-17 PROCEDURE — G0268 REMOVAL OF IMPACTED WAX MD: CPT

## 2024-12-17 PROCEDURE — 31575 DIAGNOSTIC LARYNGOSCOPY: CPT

## 2024-12-17 PROCEDURE — 92557 COMPREHENSIVE HEARING TEST: CPT

## 2024-12-17 PROCEDURE — 92567 TYMPANOMETRY: CPT

## 2024-12-17 RX ORDER — IRBESARTAN 300 MG/1
TABLET ORAL
Refills: 0 | Status: ACTIVE | COMMUNITY

## 2024-12-17 RX ORDER — METOPROLOL SUCCINATE 200 MG/1
TABLET, EXTENDED RELEASE ORAL
Refills: 0 | Status: ACTIVE | COMMUNITY

## 2024-12-17 RX ORDER — ATORVASTATIN CALCIUM 80 MG/1
TABLET, FILM COATED ORAL
Refills: 0 | Status: ACTIVE | COMMUNITY

## 2024-12-17 RX ORDER — ALFUZOSIN HYDROCHLORIDE 10 MG/1
TABLET, EXTENDED RELEASE ORAL
Refills: 0 | Status: ACTIVE | COMMUNITY

## 2024-12-17 RX ORDER — SITAGLIPTIN 100 MG/1
TABLET, FILM COATED ORAL
Refills: 0 | Status: ACTIVE | COMMUNITY

## 2024-12-17 RX ORDER — GLIMEPIRIDE 3 MG/1
TABLET ORAL
Refills: 0 | Status: ACTIVE | COMMUNITY

## 2025-07-15 ENCOUNTER — NON-APPOINTMENT (OUTPATIENT)
Age: 85
End: 2025-07-15

## 2025-07-15 ENCOUNTER — APPOINTMENT (OUTPATIENT)
Dept: OTOLARYNGOLOGY | Facility: CLINIC | Age: 85
End: 2025-07-15
Payer: MEDICARE

## 2025-07-15 PROCEDURE — 99214 OFFICE O/P EST MOD 30 MIN: CPT | Mod: 25

## 2025-07-15 PROCEDURE — 69210 REMOVE IMPACTED EAR WAX UNI: CPT | Mod: LT

## 2025-07-15 RX ORDER — CLOPIDOGREL 75 MG/1
TABLET, FILM COATED ORAL
Refills: 0 | Status: ACTIVE | COMMUNITY